# Patient Record
Sex: FEMALE | HISPANIC OR LATINO | URBAN - METROPOLITAN AREA
[De-identification: names, ages, dates, MRNs, and addresses within clinical notes are randomized per-mention and may not be internally consistent; named-entity substitution may affect disease eponyms.]

---

## 2018-10-23 ENCOUNTER — OFFICE VISIT (OUTPATIENT)
Dept: URBAN - METROPOLITAN AREA CLINIC 23 | Facility: CLINIC | Age: 43
End: 2018-10-23

## 2018-10-23 DIAGNOSIS — H11.001 PTERYGIUM OF RIGHT EYE: Primary | ICD-10-CM

## 2018-10-23 DIAGNOSIS — H11.152 PINGUECULA, LEFT EYE: ICD-10-CM

## 2018-10-23 PROCEDURE — 99202 OFFICE O/P NEW SF 15 MIN: CPT | Performed by: OPTOMETRIST

## 2018-10-23 NOTE — IMPRESSION/PLAN
Impression: Pterygium of right eye: H11.001. Plan: Examination findings were discussed. Artificial tears and gel drops are recommended throughout the day. Environmental factors can be modified. Pt to wear sunglasses outside. Sx not medically necessary right now. If pt is uncomfortable after using drops, can consider sx.